# Patient Record
Sex: MALE
[De-identification: names, ages, dates, MRNs, and addresses within clinical notes are randomized per-mention and may not be internally consistent; named-entity substitution may affect disease eponyms.]

---

## 2022-05-12 ENCOUNTER — APPOINTMENT (OUTPATIENT)
Dept: HUMAN REPRODUCTION | Facility: CLINIC | Age: 42
End: 2022-05-12

## 2022-05-25 PROBLEM — Z00.00 ENCOUNTER FOR PREVENTIVE HEALTH EXAMINATION: Status: ACTIVE | Noted: 2022-05-25

## 2022-06-09 ENCOUNTER — APPOINTMENT (OUTPATIENT)
Dept: HUMAN REPRODUCTION | Facility: CLINIC | Age: 42
End: 2022-06-09
Payer: COMMERCIAL

## 2022-06-09 PROCEDURE — 89259 CRYOPRESERVATION SPERM: CPT

## 2022-06-09 PROCEDURE — 36415 COLL VENOUS BLD VENIPUNCTURE: CPT

## 2022-06-28 ENCOUNTER — APPOINTMENT (OUTPATIENT)
Dept: UROLOGY | Facility: CLINIC | Age: 42
End: 2022-06-28

## 2022-06-28 VITALS
SYSTOLIC BLOOD PRESSURE: 137 MMHG | HEIGHT: 66 IN | TEMPERATURE: 98 F | OXYGEN SATURATION: 100 % | HEART RATE: 68 BPM | BODY MASS INDEX: 25.71 KG/M2 | WEIGHT: 160 LBS | DIASTOLIC BLOOD PRESSURE: 86 MMHG

## 2022-06-28 DIAGNOSIS — N46.9 MALE INFERTILITY, UNSPECIFIED: ICD-10-CM

## 2022-06-28 DIAGNOSIS — F52.32 MALE ORGASMIC DISORDER: ICD-10-CM

## 2022-06-28 DIAGNOSIS — G37.3 ACUTE TRANSVERSE MYELITIS IN DEMYELINATING DISEASE OF CENTRAL NERVOUS SYSTEM: ICD-10-CM

## 2022-06-28 DIAGNOSIS — N31.9 NEUROMUSCULAR DYSFUNCTION OF BLADDER, UNSPECIFIED: ICD-10-CM

## 2022-06-28 PROCEDURE — 99205 OFFICE O/P NEW HI 60 MIN: CPT | Mod: 25

## 2022-06-28 PROCEDURE — 51798 US URINE CAPACITY MEASURE: CPT

## 2022-06-28 PROCEDURE — 51741 ELECTRO-UROFLOWMETRY FIRST: CPT

## 2022-06-28 NOTE — PHYSICAL EXAM
[General Appearance - Well Developed] : well developed [General Appearance - Well Nourished] : well nourished [Normal Appearance] : normal appearance [Well Groomed] : well groomed [General Appearance - In No Acute Distress] : no acute distress [Edema] : no peripheral edema [Respiration, Rhythm And Depth] : normal respiratory rhythm and effort [Exaggerated Use Of Accessory Muscles For Inspiration] : no accessory muscle use [Abdomen Soft] : soft [Abdomen Tenderness] : non-tender [Costovertebral Angle Tenderness] : no ~M costovertebral angle tenderness [Urethral Meatus] : meatus normal [Urinary Bladder Findings] : the bladder was normal on palpation [Scrotum] : the scrotum was normal [Testes Mass (___cm)] : there were no testicular masses [Normal Station and Gait] : the gait and station were normal for the patient's age [] : no rash [No Focal Deficits] : no focal deficits [Oriented To Time, Place, And Person] : oriented to person, place, and time [Affect] : the affect was normal [Not Anxious] : not anxious [Mood] : the mood was normal [No Palpable Adenopathy] : no palpable adenopathy [Penis Abnormality] : normal circumcised penis [FreeTextEntry1] : normal testes decreased sensitivity in the S1 area

## 2022-06-28 NOTE — HISTORY OF PRESENT ILLNESS
[FreeTextEntry1] : male infertility \par \par trying to get pregnant for last 7 months \par  7 months \par \par Ruby Novak \par 8/5/1989\par \par 33 years \par \par Wife never pregnant \par \par Patient achieved pregnancy in 1999 \par \par Seeing Dr. Elise \par \par vol 5.6\par density 66.25\par morphology 1%\par \par hx of transverse myelitis \par in 2009 - 13 years ago \par complete paralysis \par right leg loss of sensation of heat and cold \par does not follow up with neurologist \par \par idiopathic \par \par walks \par \par erections are good \par sex drive \par masturbates = takes him 10 min to 60 min \par mostly longer\par \par rare that he able to ejaculate typically tries 20 min \par \par Have not ejaculated inside his wife so far \par \par \par \par

## 2022-06-28 NOTE — ASSESSMENT
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 Elise \par \par needs eval of neurogenic bladder and renal ultrasound next visit \par also scrotal to be sure no other issues leading to low morphology \par \par increase frequency of ejaculation \par \par total time 65 min

## 2022-08-05 LAB
FSH SERPL-MCNC: 9.3 IU/L
INHIBIN B: 109.3 PG/ML
LH SERPL-ACNC: 9.8 IU/L
PROLACTIN SERPL-MCNC: 21.8 NG/ML
TESTOST FREE SERPL-MCNC: 20.6 PG/ML
TESTOST SERPL-MCNC: 486 NG/DL

## 2022-08-22 ENCOUNTER — APPOINTMENT (OUTPATIENT)
Dept: UROLOGY | Facility: CLINIC | Age: 42
End: 2022-08-22

## 2022-10-22 ENCOUNTER — APPOINTMENT (OUTPATIENT)
Dept: HUMAN REPRODUCTION | Facility: CLINIC | Age: 42
End: 2022-10-22

## 2022-11-01 ENCOUNTER — APPOINTMENT (OUTPATIENT)
Dept: UROLOGY | Facility: CLINIC | Age: 42
End: 2022-11-01